# Patient Record
Sex: MALE
[De-identification: names, ages, dates, MRNs, and addresses within clinical notes are randomized per-mention and may not be internally consistent; named-entity substitution may affect disease eponyms.]

---

## 2017-05-24 ENCOUNTER — HOSPITAL (OUTPATIENT)
Dept: OTHER | Age: 77
End: 2017-05-24
Attending: INTERNAL MEDICINE

## 2017-05-27 ENCOUNTER — CHARTING TRANS (OUTPATIENT)
Dept: OTHER | Age: 77
End: 2017-05-27

## 2022-06-10 ENCOUNTER — APPOINTMENT (OUTPATIENT)
Dept: GENERAL RADIOLOGY | Age: 82
End: 2022-06-10
Attending: PHYSICIAN ASSISTANT
Payer: MEDICARE

## 2022-06-10 ENCOUNTER — HOSPITAL ENCOUNTER (EMERGENCY)
Age: 82
Discharge: HOME | End: 2022-06-10
Attending: EMERGENCY MEDICINE
Payer: MEDICARE

## 2022-06-10 VITALS
HEART RATE: 60 BPM | RESPIRATION RATE: 18 BRPM | SYSTOLIC BLOOD PRESSURE: 132 MMHG | TEMPERATURE: 99 F | WEIGHT: 176 LBS | HEIGHT: 69 IN | OXYGEN SATURATION: 99 % | DIASTOLIC BLOOD PRESSURE: 62 MMHG | BODY MASS INDEX: 26.07 KG/M2

## 2022-06-10 DIAGNOSIS — S46.811A TRAPEZIUS STRAIN, RIGHT, INITIAL ENCOUNTER: ICD-10-CM

## 2022-06-10 DIAGNOSIS — S16.1XXA STRAIN OF NECK MUSCLE, INITIAL ENCOUNTER: ICD-10-CM
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
## 2022-06-10 PROCEDURE — 99283 EMERGENCY DEPT VISIT LOW MDM: CPT

## 2022-06-10 PROCEDURE — 72050 X-RAY EXAM NECK SPINE 4/5VWS: CPT | Performed by: PHYSICIAN ASSISTANT

## 2022-06-10 PROCEDURE — 99284 EMERGENCY DEPT VISIT MOD MDM: CPT

## 2022-06-10 RX ORDER — MONTELUKAST SODIUM 10 MG/1
10 TABLET ORAL NIGHTLY
COMMUNITY

## 2022-06-10 RX ORDER — ASPIRIN 81 MG/1
81 TABLET ORAL DAILY
COMMUNITY

## 2022-06-10 RX ORDER — LIDOCAINE 50 MG/G
1 PATCH TOPICAL EVERY 24 HOURS
Qty: 15 EACH | Refills: 0 | Status: SHIPPED | OUTPATIENT
Start: 2022-06-10

## 2022-06-10 RX ORDER — ANTIOX #8/OM3/DHA/EPA/LUT/ZEAX 250-2.5 MG
CAPSULE ORAL
COMMUNITY

## 2022-06-10 RX ORDER — MEMANTINE HYDROCHLORIDE 5 MG/1
5 TABLET ORAL 2 TIMES DAILY
COMMUNITY

## 2022-06-10 RX ORDER — SIMVASTATIN 20 MG
20 TABLET ORAL NIGHTLY
COMMUNITY

## 2022-06-10 RX ORDER — LOSARTAN POTASSIUM AND HYDROCHLOROTHIAZIDE 25; 100 MG/1; MG/1
1 TABLET ORAL DAILY
COMMUNITY

## 2022-06-10 RX ORDER — GARLIC EXTRACT 500 MG
1 CAPSULE ORAL DAILY
COMMUNITY

## 2022-06-10 RX ORDER — CHOLECALCIFEROL (VITAMIN D3) 50 MCG
TABLET ORAL
COMMUNITY
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
## 2022-09-19 ENCOUNTER — APPOINTMENT (OUTPATIENT)
Dept: URBAN - METROPOLITAN AREA CLINIC 247 | Age: 82
Setting detail: DERMATOLOGY
End: 2022-09-22

## 2022-09-19 DIAGNOSIS — L57.0 ACTINIC KERATOSIS: ICD-10-CM

## 2022-09-19 DIAGNOSIS — D18.0 HEMANGIOMA: ICD-10-CM

## 2022-09-19 DIAGNOSIS — L82.1 OTHER SEBORRHEIC KERATOSIS: ICD-10-CM

## 2022-09-19 DIAGNOSIS — Z71.89 OTHER SPECIFIED COUNSELING: ICD-10-CM

## 2022-09-19 DIAGNOSIS — L57.8 OTHER SKIN CHANGES DUE TO CHRONIC EXPOSURE TO NONIONIZING RADIATION: ICD-10-CM

## 2022-09-19 DIAGNOSIS — L91.8 OTHER HYPERTROPHIC DISORDERS OF THE SKIN: ICD-10-CM

## 2022-09-19 DIAGNOSIS — L85.3 XEROSIS CUTIS: ICD-10-CM

## 2022-09-19 PROBLEM — D18.01 HEMANGIOMA OF SKIN AND SUBCUTANEOUS TISSUE: Status: ACTIVE | Noted: 2022-09-19

## 2022-09-19 PROCEDURE — 99213 OFFICE O/P EST LOW 20 MIN: CPT | Mod: 25

## 2022-09-19 PROCEDURE — OTHER LIQUID NITROGEN: OTHER

## 2022-09-19 PROCEDURE — 17003 DESTRUCT PREMALG LES 2-14: CPT

## 2022-09-19 PROCEDURE — 17000 DESTRUCT PREMALG LESION: CPT

## 2022-09-19 PROCEDURE — OTHER MIPS QUALITY: OTHER

## 2022-09-19 PROCEDURE — OTHER COUNSELING: OTHER

## 2022-09-19 ASSESSMENT — LOCATION DETAILED DESCRIPTION DERM
LOCATION DETAILED: LEFT SUPERIOR MEDIAL UPPER BACK
LOCATION DETAILED: LEFT INFERIOR MEDIAL MALAR CHEEK
LOCATION DETAILED: LEFT CENTRAL MALAR CHEEK
LOCATION DETAILED: UPPER STERNUM
LOCATION DETAILED: LEFT INFERIOR MEDIAL MIDBACK
LOCATION DETAILED: RIGHT PROXIMAL PRETIBIAL REGION
LOCATION DETAILED: LEFT PROXIMAL PRETIBIAL REGION
LOCATION DETAILED: LEFT INFERIOR CENTRAL MALAR CHEEK
LOCATION DETAILED: RIGHT CENTRAL MALAR CHEEK
LOCATION DETAILED: LEFT MEDIAL UPPER BACK
LOCATION DETAILED: RIGHT MEDIAL SUPERIOR CHEST
LOCATION DETAILED: LEFT INFERIOR HELIX
LOCATION DETAILED: RIGHT TRAGUS
LOCATION DETAILED: LEFT CENTRAL TEMPLE
LOCATION DETAILED: LEFT INFERIOR ANTERIOR NECK

## 2022-09-19 ASSESSMENT — LOCATION SIMPLE DESCRIPTION DERM
LOCATION SIMPLE: LEFT ANTERIOR NECK
LOCATION SIMPLE: LEFT CHEEK
LOCATION SIMPLE: RIGHT CHEEK
LOCATION SIMPLE: RIGHT PRETIBIAL REGION
LOCATION SIMPLE: LEFT LOWER BACK
LOCATION SIMPLE: CHEST
LOCATION SIMPLE: LEFT UPPER BACK
LOCATION SIMPLE: RIGHT EAR
LOCATION SIMPLE: LEFT TEMPLE
LOCATION SIMPLE: LEFT PRETIBIAL REGION
LOCATION SIMPLE: LEFT EAR

## 2022-09-19 ASSESSMENT — LOCATION ZONE DERM
LOCATION ZONE: EAR
LOCATION ZONE: TRUNK
LOCATION ZONE: FACE
LOCATION ZONE: NECK
LOCATION ZONE: LEG

## 2022-09-19 NOTE — PROCEDURE: LIQUID NITROGEN
Application Tool (Optional): Cry-AC
Duration Of Freeze Thaw-Cycle (Seconds): 5
Render Note In Bullet Format When Appropriate: No
Render Post-Care Instructions In Note?: yes
Post-Care Instructions: I reviewed with the patient in detail post-care instructions. Patient is to wear sunprotection, and avoid picking at any of the treated lesions. Pt may apply Vaseline to crusted or scabbing areas.
Number Of Freeze-Thaw Cycles: 2 freeze-thaw cycles
Consent: The patient's consent was obtained including but not limited to risks of crusting, scabbing, blistering, scarring, darker or lighter pigmentary change, recurrence, incomplete removal and infection.
Detail Level: Detailed

## 2022-09-19 NOTE — PROCEDURE: COUNSELING
Detail Level: Zone
Nicotinamide Supplementation Recommendations: Nicotinamide is purchased over-the-counter in 500 mg capsules.  Nicotinamide should be taken as one 500 mg capsule twice a day. Supplementation with Nicotinamide does not replace sunscreen application.
Detail Level: Detailed
Detail Level: Generalized
Sunscreen Recommendations: SPF 50 or higher, applied daily or hourly when heavy sun exposure is anticipated

## 2023-09-22 ENCOUNTER — APPOINTMENT (OUTPATIENT)
Dept: URBAN - METROPOLITAN AREA CLINIC 247 | Age: 83
Setting detail: DERMATOLOGY
End: 2023-09-22

## 2023-09-22 DIAGNOSIS — L82.1 OTHER SEBORRHEIC KERATOSIS: ICD-10-CM

## 2023-09-22 DIAGNOSIS — L57.0 ACTINIC KERATOSIS: ICD-10-CM

## 2023-09-22 DIAGNOSIS — Z71.89 OTHER SPECIFIED COUNSELING: ICD-10-CM

## 2023-09-22 DIAGNOSIS — L91.8 OTHER HYPERTROPHIC DISORDERS OF THE SKIN: ICD-10-CM

## 2023-09-22 DIAGNOSIS — L57.8 OTHER SKIN CHANGES DUE TO CHRONIC EXPOSURE TO NONIONIZING RADIATION: ICD-10-CM

## 2023-09-22 DIAGNOSIS — L85.3 XEROSIS CUTIS: ICD-10-CM

## 2023-09-22 DIAGNOSIS — D18.0 HEMANGIOMA: ICD-10-CM

## 2023-09-22 PROBLEM — D18.01 HEMANGIOMA OF SKIN AND SUBCUTANEOUS TISSUE: Status: ACTIVE | Noted: 2023-09-22

## 2023-09-22 PROCEDURE — OTHER COUNSELING: OTHER

## 2023-09-22 PROCEDURE — 99213 OFFICE O/P EST LOW 20 MIN: CPT | Mod: 25

## 2023-09-22 PROCEDURE — 17004 DESTROY PREMAL LESIONS 15/>: CPT

## 2023-09-22 PROCEDURE — OTHER MIPS QUALITY: OTHER

## 2023-09-22 PROCEDURE — OTHER LIQUID NITROGEN: OTHER

## 2023-09-22 ASSESSMENT — LOCATION SIMPLE DESCRIPTION DERM
LOCATION SIMPLE: RIGHT FOREHEAD
LOCATION SIMPLE: LEFT PRETIBIAL REGION
LOCATION SIMPLE: LEFT LOWER BACK
LOCATION SIMPLE: LEFT FOREHEAD
LOCATION SIMPLE: LEFT TEMPLE
LOCATION SIMPLE: CHEST
LOCATION SIMPLE: RIGHT TEMPLE
LOCATION SIMPLE: RIGHT PRETIBIAL REGION
LOCATION SIMPLE: RIGHT CHEEK
LOCATION SIMPLE: LEFT ANTERIOR NECK
LOCATION SIMPLE: LEFT CHEEK
LOCATION SIMPLE: LEFT UPPER BACK
LOCATION SIMPLE: RIGHT EAR

## 2023-09-22 ASSESSMENT — LOCATION DETAILED DESCRIPTION DERM
LOCATION DETAILED: LEFT INFERIOR MEDIAL MALAR CHEEK
LOCATION DETAILED: LEFT PROXIMAL PRETIBIAL REGION
LOCATION DETAILED: LEFT MEDIAL UPPER BACK
LOCATION DETAILED: LEFT INFERIOR ANTERIOR NECK
LOCATION DETAILED: LEFT LATERAL FOREHEAD
LOCATION DETAILED: LEFT CENTRAL TEMPLE
LOCATION DETAILED: LEFT MID PREAURICULAR CHEEK
LOCATION DETAILED: RIGHT SUPERIOR FOREHEAD
LOCATION DETAILED: RIGHT MEDIAL SUPERIOR CHEST
LOCATION DETAILED: LEFT FOREHEAD
LOCATION DETAILED: LEFT CENTRAL MALAR CHEEK
LOCATION DETAILED: LEFT INFERIOR MEDIAL MIDBACK
LOCATION DETAILED: RIGHT SUPERIOR LATERAL MALAR CHEEK
LOCATION DETAILED: RIGHT SUPERIOR HELIX
LOCATION DETAILED: RIGHT CENTRAL TEMPLE
LOCATION DETAILED: LEFT SUPERIOR PREAURICULAR CHEEK
LOCATION DETAILED: RIGHT MEDIAL MALAR CHEEK
LOCATION DETAILED: RIGHT INFERIOR MEDIAL MALAR CHEEK
LOCATION DETAILED: RIGHT SUPERIOR MEDIAL MALAR CHEEK
LOCATION DETAILED: LEFT MEDIAL TEMPLE
LOCATION DETAILED: RIGHT PROXIMAL PRETIBIAL REGION
LOCATION DETAILED: UPPER STERNUM
LOCATION DETAILED: LEFT INFERIOR LATERAL MALAR CHEEK
LOCATION DETAILED: LEFT SUPERIOR MEDIAL UPPER BACK

## 2023-09-22 ASSESSMENT — LOCATION ZONE DERM
LOCATION ZONE: LEG
LOCATION ZONE: EAR
LOCATION ZONE: FACE
LOCATION ZONE: NECK
LOCATION ZONE: TRUNK

## 2024-09-26 ENCOUNTER — APPOINTMENT (OUTPATIENT)
Dept: GENERAL RADIOLOGY | Age: 84
End: 2024-09-26
Attending: NURSE PRACTITIONER
Payer: MEDICARE

## 2024-09-26 ENCOUNTER — APPOINTMENT (OUTPATIENT)
Dept: CT IMAGING | Age: 84
End: 2024-09-26
Attending: NURSE PRACTITIONER
Payer: MEDICARE

## 2024-09-26 ENCOUNTER — HOSPITAL ENCOUNTER (EMERGENCY)
Age: 84
Discharge: HOME | End: 2024-09-26
Attending: EMERGENCY MEDICINE
Payer: MEDICARE

## 2024-09-26 VITALS
HEART RATE: 70 BPM | DIASTOLIC BLOOD PRESSURE: 88 MMHG | RESPIRATION RATE: 16 BRPM | BODY MASS INDEX: 26 KG/M2 | SYSTOLIC BLOOD PRESSURE: 191 MMHG | TEMPERATURE: 98 F | OXYGEN SATURATION: 100 % | WEIGHT: 178 LBS

## 2024-09-26 DIAGNOSIS — T14.8XXA ABRASION: ICD-10-CM

## 2024-09-26 DIAGNOSIS — S01.01XA LACERATION OF SCALP, INITIAL ENCOUNTER: Primary | ICD-10-CM
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
## 2024-09-26 DIAGNOSIS — M54.9 MILD BACK PAIN: ICD-10-CM

## 2024-09-26 DIAGNOSIS — M19.019 SHOULDER ARTHRITIS: ICD-10-CM

## 2024-09-26 DIAGNOSIS — M25.521 RIGHT ELBOW PAIN: ICD-10-CM

## 2024-09-26 DIAGNOSIS — S09.90XA INJURY OF HEAD, INITIAL ENCOUNTER: ICD-10-CM

## 2024-09-26 PROCEDURE — 99284 EMERGENCY DEPT VISIT MOD MDM: CPT

## 2024-09-26 PROCEDURE — 90471 IMMUNIZATION ADMIN: CPT

## 2024-09-26 PROCEDURE — 72072 X-RAY EXAM THORAC SPINE 3VWS: CPT | Performed by: NURSE PRACTITIONER

## 2024-09-26 PROCEDURE — 12002 RPR S/N/AX/GEN/TRNK2.6-7.5CM: CPT

## 2024-09-26 PROCEDURE — 73030 X-RAY EXAM OF SHOULDER: CPT | Performed by: NURSE PRACTITIONER

## 2024-09-26 PROCEDURE — 71046 X-RAY EXAM CHEST 2 VIEWS: CPT | Performed by: NURSE PRACTITIONER

## 2024-09-26 PROCEDURE — 99285 EMERGENCY DEPT VISIT HI MDM: CPT

## 2024-09-26 PROCEDURE — 72050 X-RAY EXAM NECK SPINE 4/5VWS: CPT | Performed by: NURSE PRACTITIONER

## 2024-09-26 PROCEDURE — 70450 CT HEAD/BRAIN W/O DYE: CPT | Performed by: NURSE PRACTITIONER

## 2024-09-26 PROCEDURE — 73080 X-RAY EXAM OF ELBOW: CPT | Performed by: NURSE PRACTITIONER

## 2024-09-26 RX ORDER — TAMSULOSIN HYDROCHLORIDE 0.4 MG/1
0.4 CAPSULE ORAL
COMMUNITY

## 2024-09-26 RX ORDER — LOSARTAN POTASSIUM 25 MG/1
TABLET ORAL DAILY
COMMUNITY

## 2024-09-26 RX ORDER — ACETAMINOPHEN 500 MG
1000 TABLET ORAL ONCE
Status: COMPLETED | OUTPATIENT
Start: 2024-09-26 | End: 2024-09-26

## 2024-09-26 RX ORDER — CITALOPRAM HYDROBROMIDE 10 MG/1
10 TABLET ORAL DAILY
COMMUNITY

## 2024-09-26 NOTE — DISCHARGE INSTRUCTIONS
Staples to be removed within 10 days.  You may medicate with over-the-counter Tylenol for pain.  Please follow-up with your primary care provider within 1 day.  If you develop any severe headache, visual disturbances or projectile vomiting please return to the emergency room for evaluation.  Your dose of acetaminophen (Tylenol) is 1000 mg every 4 hours for pain or fevers as needed.

## 2024-09-26 NOTE — ED INITIAL ASSESSMENT (HPI)
Fell out of bed in the middle of the night, presents with lac to top of head and right shoulder injury

## 2024-09-26 NOTE — ED PROVIDER NOTES
Patient Seen in: [facility] In [place]      History     Chief Complaint   Patient presents with    Trauma     Stated Complaint: fell out of bed at 0300, head injury. right shoulder injury    Subjective:   HPI    84-year-old male fell out of bed at 3:00 this morning and hit the back of his head and injured his right shoulder.  Patient states he hit the back of his head on his nightstand.  Patient denies any loss of consciousness.  Patient states that his wife was sleeping in the room across from him and he did not notify her.  Patient states he is having most of the pain in the right arm.  Patient denies any history of concussions or head injuries in his past.  Patient states he was able to get dressed on his own this morning.    Objective:   Past Medical History:    Asthma (HCC)    Atherosclerosis of coronary artery    Essential hypertension              Past Surgical History:   Procedure Laterality Date    Addl neck spine fusion      Eye surgery      Tonsillectomy                  Social History     Socioeconomic History    Marital status:    Tobacco Use    Smoking status: Never    Smokeless tobacco: Never   Substance and Sexual Activity    Alcohol use: Yes     Comment: occassional    Drug use: Never              Review of Systems   Constitutional: Negative.    HENT: Negative.     Eyes: Negative.    Respiratory: Negative.     Cardiovascular: Negative.    Gastrointestinal: Negative.    Endocrine: Negative.    Genitourinary: Negative.    Musculoskeletal:  Positive for arthralgias and joint swelling.   Skin:  Positive for wound.   Allergic/Immunologic: Negative.    Neurological: Negative.    Hematological: Negative.    Psychiatric/Behavioral: Negative.         Positive for stated Chief Complaint: Trauma    Other systems are as noted in HPI.  Constitutional and vital signs reviewed.      All other systems reviewed and negative except as noted above.    Physical Exam     ED Triage Vitals  [09/26/24 0941]   /79   Pulse 78   Resp 16   Temp 98.3 °F (36.8 °C)   Temp src Temporal   SpO2 100 %   O2 Device        Current Vitals:   Vital Signs  BP: 160/79  Pulse: 78  Resp: 16  Temp: 98.3 °F (36.8 °C)  Temp src: Temporal    Oxygen Therapy  SpO2: 100 %            Physical Exam  Vitals and nursing note reviewed. Exam conducted with a chaperone present.   Constitutional:       Appearance: Normal appearance.   HENT:      Head:      Comments: 4 cm linear laceration appreciated to the right side of the posterior scalp.  Bleeding.     Right Ear: Tympanic membrane, ear canal and external ear normal.      Left Ear: Tympanic membrane, ear canal and external ear normal.      Nose: Nose normal.      Mouth/Throat:      Mouth: Mucous membranes are moist.      Pharynx: Oropharynx is clear.   Eyes:      Extraocular Movements: Extraocular movements intact.      Conjunctiva/sclera: Conjunctivae normal.      Pupils: Pupils are equal, round, and reactive to light.   Cardiovascular:      Rate and Rhythm: Normal rate and regular rhythm.      Pulses: Normal pulses.      Heart sounds: Normal heart sounds.   Pulmonary:      Effort: Pulmonary effort is normal.      Breath sounds: Normal breath sounds.   Musculoskeletal:         General: Swelling, tenderness and signs of injury present.      Cervical back: Normal range of motion and neck supple.      Comments: Lipoma-like lesion appreciated to the posterior aspect of the right shoulder.  Pain elicited with abduction and flexion of right shoulder.  Ecchymosis and abrasion and swelling noted to the lateral aspect of the right elbow.   Skin:     Capillary Refill: Capillary refill takes less than 2 seconds.      Findings: Bruising and erythema present.   Neurological:      Mental Status: He is alert.   Psychiatric:         Mood and Affect: Mood normal.             ED Course   Labs Reviewed - No data to display  Cleansed laceration site to the back of the head with peroxide.  Let gel  applied on the laceration site.  Laceration site is about 1 inch linear.  Wound explored.  No foreign body noted.  Approximated laceration site with 5 staples.  Dressing applied.  Patient tolerated procedure well with minimal blood loss.                 MDM     84-year-old male fell out of bed at 3:00 this morning and hit the back of his head and injured his right shoulder.  Patient states he hit the back of his head on his nightstand.  Patient denies any loss of consciousness.  Patient states that his wife was sleeping in the room across from him and he did not notify her.  Patient states he is having most of the pain in the right arm.  Patient denies any history of concussions or head injuries in his past.  Patient states he was able to get dressed on his own this morning.  Vital signs: Please see EMR.  Physical exam: Please see exam.  Differential diagnosis: Intracranial process, scalp laceration, hematoma, fracture, dislocation, abrasion, fall.  [place] head CT rule: Consider for CT  XR THORACIC SPINE (3 VIEWS) (CPT=72072)    Result Date: 9/26/2024  CONCLUSION:  1. No acute process. 2. Mild to moderate multilevel disc disease and mild dextroscoliosis. 3. Diffuse DISH changes.   LOCATION:  [facility]    Dictated by (CST): Alley Schulz DO on 9/26/2024 at 12:09 PM     Finalized by (CST): Alley Schulz DO on 9/26/2024 at 12:11 PM       XR ELBOW, COMPLETE (MIN 3 VIEWS), RIGHT (CPT=73080)    Result Date: 9/26/2024  CONCLUSION:  1. No acute process. 2. Chronic enthesopathy along the common extensor insertion on the lateral epicondyle as described above.   LOCATION:  [facility]    Dictated by (CST): Alley Schulz DO on 9/26/2024 at 12:07 PM     Finalized by (CST): Alely Schulz DO on 9/26/2024 at 12:09 PM       XR SHOULDER, COMPLETE (MIN 2 VIEWS), RIGHT (CPT=73030)    Result Date: 9/26/2024  CONCLUSION:  1. No acute process noted. 2. Mild right AC joint and glenohumeral joint osteoarthritis. 3. Evidence of rotator cuff  enthesopathy.   LOCATION:  [facility]   Dictated by (CST): Mariana SchulzDO candelario on 9/26/2024 at 12:04 PM     Finalized by (CST): Alley SchulzDO on 9/26/2024 at 12:07 PM       XR CHEST PA + LAT CHEST (CPT=71046)    Result Date: 9/26/2024  CONCLUSION:  Lungs are clear.  No acute cardiopulmonary disease.   LOCATION:  [facility]   Dictated by (CST): Zeus MarianaDO candelario on 9/26/2024 at 12:02 PM     Finalized by (CST): ZeusAlleyDO on 9/26/2024 at 12:04 PM       XR CERVICAL SPINE (4VIEWS) (CPT=72050)    Result Date: 9/26/2024  CONCLUSION:    Anterior cervical spine hardware fusion C5-6 appears stable.  Stable mild degenerative subluxations C3 upon C4, C4 upon C5, minimal, unchanged.  No traumatic subluxation.  Degenerative disc and facet disease present multilevel.  No prevertebral swelling.  No fracture lines are seen.  LOCATION:  [facility]   Dictated by (CST): Baljinder Maurer MD on 9/26/2024 at 11:58 AM     Finalized by (CST): Baljinder Maurer MD on 9/26/2024 at 11:59 AM       CT BRAIN OR HEAD (CPT=70450)    Result Date: 9/26/2024  CONCLUSION:  There is chronic small vessel ischemic change and atrophy evident.  There is no specific evidence of acute ischemia, hemorrhage or mass.    LOCATION:  [facility]   Dictated by (CST): Simon Tellez MD on 9/26/2024 at 11:26 AM     Finalized by (CST): Simon Tellez MD on 9/26/2024 at 11:27 AM      Based on physical exam and HPI will diagnosed with scalp laceration, head injury, fall, abrasion, elbow pain, back pain.  Patient is to follow-up with primary care provider within 1 day after head injury.  Staples to be removed within 10 days.  ED precautions given.                                   Medical Decision Making  84-year-old male fell out of bed at 3:00 this morning and hit the back of his head and injured his right shoulder.  Patient states he hit the back of his head on his nightstand.  Patient denies any loss of consciousness.  Patient states that his wife was sleeping in the room across  from him and he did not notify her.  Patient states he is having most of the pain in the right arm.  Patient denies any history of concussions or head injuries in his past.  Patient states he was able to get dressed on his own this morning.    Problems Addressed:  Abrasion: acute illness or injury  Fall, initial encounter: acute illness or injury  Injury of head, initial encounter: acute illness or injury  Laceration of scalp, initial encounter: acute illness or injury  Mild back pain: acute illness or injury  Right elbow pain: acute illness or injury  Shoulder arthritis: acute illness or injury    Amount and/or Complexity of Data Reviewed  Independent Historian: spouse  External Data Reviewed: notes.  Radiology: ordered. Decision-making details documented in ED Course.  ECG/medicine tests: ordered. Decision-making details documented in ED Course.    Risk  OTC drugs.        Disposition and Plan     Clinical Impression:  1. Laceration of scalp, initial encounter    2. Injury of head, initial encounter    3. Fall, initial encounter    4. Abrasion    5. Mild back pain    6. Shoulder arthritis    7. Right elbow pain         Disposition:  Discharge  9/26/2024 12:15 pm    Follow-up:  Teena Mac MD  [id number] [address] [place] [id number]  [phone]    Follow up in 1 day(s)            Medications Prescribed:  Current Discharge Medication List